# Patient Record
Sex: MALE | Race: WHITE | Employment: UNEMPLOYED | URBAN - METROPOLITAN AREA
[De-identification: names, ages, dates, MRNs, and addresses within clinical notes are randomized per-mention and may not be internally consistent; named-entity substitution may affect disease eponyms.]

---

## 2017-07-17 ENCOUNTER — HOSPITAL ENCOUNTER (OUTPATIENT)
Dept: LAB | Age: 32
Discharge: HOME | End: 2017-07-17

## 2017-07-17 PROCEDURE — 86431 RHEUMATOID FACTOR QUANT: CPT | Performed by: INTERNAL MEDICINE

## 2017-07-18 LAB — RHEUMATOID FACT SERPL-ACNC: <10 IU/ML

## 2017-07-19 ENCOUNTER — HOSPITAL ENCOUNTER (OUTPATIENT)
Dept: LAB | Age: 32
Discharge: HOME | End: 2017-07-19

## 2017-07-19 PROCEDURE — 81374 HLA I TYPING 1 ANTIGEN LR: CPT | Performed by: INTERNAL MEDICINE

## 2017-07-24 LAB — HLA-B27 QL NAA+PROBE: NEGATIVE

## 2018-05-19 ENCOUNTER — HOSPITAL ENCOUNTER (EMERGENCY)
Age: 33
Discharge: HOME | End: 2018-05-19
Attending: EMERGENCY MEDICINE
Payer: SUBSIDIZED

## 2018-05-19 VITALS
OXYGEN SATURATION: 98 % | DIASTOLIC BLOOD PRESSURE: 85 MMHG | HEART RATE: 89 BPM | BODY MASS INDEX: 22.51 KG/M2 | TEMPERATURE: 98.9 F | SYSTOLIC BLOOD PRESSURE: 142 MMHG | WEIGHT: 152 LBS | HEIGHT: 69 IN | RESPIRATION RATE: 15 BRPM

## 2018-05-19 DIAGNOSIS — L02.91 ABSCESS: Primary | ICD-10-CM

## 2018-05-19 PROCEDURE — 87186 SC STD MICRODIL/AGAR DIL: CPT | Performed by: NURSE PRACTITIONER

## 2018-05-19 PROCEDURE — 74011000250 HC RX REV CODE- 250: Performed by: NURSE PRACTITIONER

## 2018-05-19 PROCEDURE — 74011250637 HC RX REV CODE- 250/637: Performed by: NURSE PRACTITIONER

## 2018-05-19 PROCEDURE — 87077 CULTURE AEROBIC IDENTIFY: CPT | Performed by: NURSE PRACTITIONER

## 2018-05-19 PROCEDURE — 87205 SMEAR GRAM STAIN: CPT | Performed by: NURSE PRACTITIONER

## 2018-05-19 PROCEDURE — 99283 EMERGENCY DEPT VISIT LOW MDM: CPT

## 2018-05-19 PROCEDURE — 75810000289 HC I&D ABSCESS SIMP/COMP/MULT

## 2018-05-19 RX ORDER — CLINDAMYCIN HYDROCHLORIDE 300 MG/1
300 CAPSULE ORAL 4 TIMES DAILY
Qty: 28 CAP | Refills: 0 | Status: SHIPPED | OUTPATIENT
Start: 2018-05-19 | End: 2018-05-26

## 2018-05-19 RX ORDER — CLINDAMYCIN HYDROCHLORIDE 150 MG/1
300 CAPSULE ORAL
Status: COMPLETED | OUTPATIENT
Start: 2018-05-19 | End: 2018-05-19

## 2018-05-19 RX ORDER — HYDROCODONE BITARTRATE AND ACETAMINOPHEN 5; 325 MG/1; MG/1
2 TABLET ORAL
Status: COMPLETED | OUTPATIENT
Start: 2018-05-19 | End: 2018-05-19

## 2018-05-19 RX ORDER — LIDOCAINE HYDROCHLORIDE 20 MG/ML
JELLY TOPICAL
Qty: 30 ML | Refills: 0 | Status: SHIPPED | OUTPATIENT
Start: 2018-05-19

## 2018-05-19 RX ADMIN — HYDROCODONE BITARTRATE AND ACETAMINOPHEN 2 TABLET: 5; 325 TABLET ORAL at 16:58

## 2018-05-19 RX ADMIN — Medication 5 ML: at 16:59

## 2018-05-19 RX ADMIN — CLINDAMYCIN HYDROCHLORIDE 300 MG: 150 CAPSULE ORAL at 16:58

## 2018-05-19 NOTE — ED TRIAGE NOTES
Patient reports hx of infection to skin around buttocks for approximately 2 years, intermittently. Patient states that often symptoms resolve with abx treatment. Hx of ulcerative colitis.

## 2018-05-19 NOTE — DISCHARGE INSTRUCTIONS

## 2018-05-19 NOTE — ED PROVIDER NOTES
HPI Comments: Aleksandar Carbajal is a 29 y.o. male with Hx of UC, OA who presents ambulatory w/ his boyfriend to [facility] ED with cc of abscess. Pt reports has frequent abscesses on groin and buttock for years. Pt reports they are normally in the fold of his buttock, denies perirectal or anal involvement. He states that he has seen his GI MD Alireza Thurman) and had Rx for Abx in the past which has helped relieve them. He has taken Motrin for pain which has helped and placed warm compresses/ taken warm baths to see if this will relieve his s/sx. Last ABx was 11/2017. He has never had an I&D. No fevers, chills, body aches, urinary s/sx, bowel habit changes, N/V/D.  (-) tobacco/ ETOH/ substance abuse. PCP: [address]     There are no other complaints, changes or physical findings at this time. The history is provided by the patient. Past Medical History:   Diagnosis Date    Arthritis     Ulcerative colitis ([facility] [address]        Past Surgical History:   Procedure Laterality Date    HX OTHER SURGICAL      abcess tooth         No family history on file. Social History     Social History    Marital status: SINGLE     Spouse name: N/A    Number of children: N/A    Years of education: N/A     Occupational History    Not on file. Social History Main Topics    Smoking status: Never Smoker    Smokeless tobacco: Not on file    Alcohol use No    Drug use: No    Sexual activity: Not on file     Other Topics Concern    Not on file     Social History Narrative         ALLERGIES: Doxycycline; Penicillins; and Prednisone    Review of Systems   Constitutional: Negative for activity change, appetite change, chills and fever. HENT: Negative for congestion, rhinorrhea, sinus pressure, sneezing and sore throat. Eyes: Negative for pain, discharge and visual disturbance. Respiratory: Negative for cough and shortness of breath. Cardiovascular: Negative for chest pain.    Gastrointestinal: Negative for abdominal pain, diarrhea, nausea and vomiting. Genitourinary: Negative for dysuria, flank pain, frequency and urgency. Musculoskeletal: Negative for arthralgias, back pain, gait problem, joint swelling, myalgias and neck pain. Skin: Positive for color change. Negative for rash. Buttock pain      Neurological: Negative for dizziness, speech difficulty, weakness, light-headedness, numbness and headaches. Psychiatric/Behavioral: Negative for agitation, behavioral problems and confusion. All other systems reviewed and are negative. Vitals:    05/19/18 1631   BP: 142/85   Pulse: 89   Resp: 15   Temp: 98.9 °F (37.2 °C)   SpO2: 98%   Weight: 68.9 kg (152 lb)   Height: 5' 9\" (1.753 m)            Physical Exam   Constitutional: He is oriented to person, place, and time. He appears well-developed and well-nourished. No distress. HENT:   Head: Normocephalic and atraumatic. Right Ear: External ear normal.   Left Ear: External ear normal.   Nose: Nose normal.   Mouth/Throat: Oropharynx is clear and moist. No oropharyngeal exudate. Eyes: Conjunctivae and EOM are normal. Pupils are equal, round, and reactive to light. Neck: Normal range of motion. Neck supple. Cardiovascular: Normal rate, regular rhythm, normal heart sounds and intact distal pulses. Pulmonary/Chest: Effort normal and breath sounds normal.   Abdominal: Soft. There is no tenderness. There is no rebound and no guarding. Genitourinary:         Musculoskeletal: Normal range of motion. Neurological: He is alert and oriented to person, place, and time. Skin: Skin is warm and dry. Psychiatric: He has a normal mood and affect. His behavior is normal. Judgment and thought content normal.   Nursing note and vitals reviewed. MDM  Number of Diagnoses or Management Options  Abscess:   Diagnosis management comments: DDx; Hematoma, seroma, abscess, cellulitis     30 yo M presents w/ concern for recurrent abscess. I&D performed in ED.  Wound Cx sent. Advised to f/u with Venkatesh Philip on Monday for wound recheck. Pt agreeable w/ d/c plan. Will take Motrin at home. Given education on wound care at home. Reasons to return to ED provided/ reviewed. Amount and/or Complexity of Data Reviewed  Review and summarize past medical records: yes  Discuss the patient with other providers: yes          ED Course       Procedures    Procedure Note - Incision and Drainage:   5:48 PM  Performed by: Siria Coronado NP  Complexity: simple  Skin prepped with Betadine. Anesthesia achieved with 5 mLs of LET using a topical application. Abscess to buttocks was incised with # 11 blade, and  3mLs of sanguinous drainage was expressed. Wound probed and irrigated. dressing applied. Estimated blood loss: < 5mL  The procedure took 1-15 minutes, and pt tolerated well. LABORATORY TESTS:  Recent Results (from the past 12 hour(s))   CULTURE, WOUND W GRAM STAIN    Collection Time: 05/19/18  5:55 PM   Result Value Ref Range    Special Requests: NO SPECIAL REQUESTS      GRAM STAIN OCCASIONAL WBCS SEEN      GRAM STAIN OCCASIONAL GRAM POSITIVE COCCI IN CLUSTERS      Culture result: PENDING        IMAGING RESULTS:  No orders to display       MEDICATIONS GIVEN:  Medications   clindamycin (CLEOCIN) capsule 300 mg (300 mg Oral Given 5/19/18 1658)   lidocaine/EPINEPHrine/tetracaine/methylcellulose (LET) topical gel gel 5 mL (5 mL Topical Given 5/19/18 1659)   HYDROcodone-acetaminophen (NORCO) 5-325 mg per tablet 2 Tab (2 Tabs Oral Given 5/19/18 1658)       IMPRESSION:  1. Abscess        PLAN:  1. Discharge Medication List as of 5/19/2018  6:00 PM      START taking these medications    Details   lidocaine (XYLOCAINE) 2 % jelly Apply thin layer to abscess region every 8 hours as needed for pain, Print, Disp-30 mL, R-0      clindamycin (CLEOCIN) 300 mg capsule Take 1 Cap by mouth four (4) times daily for 7 days. , Print, Disp-28 Cap, R-0         CONTINUE these medications which have NOT CHANGED    Details   ferrous sulfate 325 mg (65 mg iron) tablet Take  by mouth two (2) times a day., Historical Med      ibuprofen (MOTRIN) 200 mg tablet Take  by mouth every eight (8) hours as needed for Pain., Historical Med      mesalamine ER (APRISO) 0.375 gram capsule Take 1.5 g by mouth daily. , Historical Med           2. Follow-up Information     Follow up With Details Comments Contact Info    [facility] Schedule an appointment as soon as possible for a visit please see on Monday for ER follow up  [address] [facility] [address]  [phone]    [facility] EMERGENCY DEPT Go to As needed, If symptoms worsen [address] [facility]  [phone]        3. Return to ED if worse   Discharge Note:    The patient is ready for discharge. The patient's signs, symptoms, diagnosis, and discharge instruction have been discussed and the patient has conveyed their understanding. The patient is to follow up as recommended or return to the ER should their symptoms worsen. Plan has been discussed and the patient is in agreement.     Fabien Mendiola NP

## 2018-05-22 LAB
BACTERIA SPEC CULT: ABNORMAL
BACTERIA SPEC CULT: ABNORMAL
GRAM STN SPEC: ABNORMAL
GRAM STN SPEC: ABNORMAL
SERVICE CMNT-IMP: ABNORMAL

## 2018-05-22 RX ORDER — SULFAMETHOXAZOLE AND TRIMETHOPRIM 800; 160 MG/1; MG/1
1 TABLET ORAL 2 TIMES DAILY
Qty: 14 TAB | Refills: 0 | Status: SHIPPED | OUTPATIENT
Start: 2018-05-22 | End: 2018-05-29

## 2018-05-22 NOTE — PROGRESS NOTES
Attempted to reach patient. Message left for call back concerning culture result and need for treatment change.

## 2018-05-22 NOTE — PROGRESS NOTES
Spoke with patient concerning culture result. He will stop clindamycin.  ER for bactrim 1 bid x 7 d to [facility] on [address]

## 2018-06-13 ENCOUNTER — HOSPITAL ENCOUNTER (OUTPATIENT)
Dept: LAB | Age: 33
Discharge: HOME | End: 2018-06-13

## 2018-06-13 LAB
HIV 1+2 AB+HIV1 P24 AG SERPL QL IA: NONREACTIVE
HIV12 RESULT COMMENT, HHIVC: NORMAL

## 2018-06-13 PROCEDURE — 86256 FLUORESCENT ANTIBODY TITER: CPT | Performed by: INTERNAL MEDICINE

## 2018-06-13 PROCEDURE — 86671 FUNGUS NES ANTIBODY: CPT | Performed by: INTERNAL MEDICINE

## 2018-06-13 PROCEDURE — 87389 HIV-1 AG W/HIV-1&-2 AB AG IA: CPT | Performed by: INTERNAL MEDICINE

## 2018-06-15 ENCOUNTER — HOSPITAL ENCOUNTER (OUTPATIENT)
Dept: LAB | Age: 33
Discharge: HOME | End: 2018-06-15

## 2018-06-15 PROCEDURE — 87493 C DIFF AMPLIFIED PROBE: CPT | Performed by: INTERNAL MEDICINE

## 2018-06-16 LAB — C DIFF TOX GENS STL QL NAA+PROBE: NEGATIVE

## 2018-06-18 LAB
BAKER'S YEAST IGA QN: 26.5 UNITS (ref 0–24.9)
BAKER'S YEAST IGG QN: <20 UNITS (ref 0–24.9)
P-ANCA ATYPICAL TITR SER IF: ABNORMAL TITER

## 2020-01-10 ENCOUNTER — HOSPITAL ENCOUNTER (EMERGENCY)
Age: 35
Discharge: HOME | End: 2020-01-11
Attending: EMERGENCY MEDICINE
Payer: SUBSIDIZED

## 2020-01-10 DIAGNOSIS — A08.4 VIRAL GASTROENTERITIS: ICD-10-CM

## 2020-01-10 DIAGNOSIS — E86.0 DEHYDRATION: Primary | ICD-10-CM

## 2020-01-10 LAB
ALBUMIN SERPL-MCNC: 3.7 G/DL (ref 3.5–5)
ALBUMIN/GLOB SERPL: 0.6 {RATIO} (ref 1.1–2.2)
ALP SERPL-CCNC: 102 U/L (ref 45–117)
ALT SERPL-CCNC: 15 U/L (ref 12–78)
ANION GAP SERPL CALC-SCNC: 6 MMOL/L (ref 5–15)
APPEARANCE UR: CLEAR
AST SERPL-CCNC: 27 U/L (ref 15–37)
BACTERIA URNS QL MICRO: NEGATIVE /HPF
BASOPHILS # BLD: 0 K/UL (ref 0–0.1)
BASOPHILS NFR BLD: 0 % (ref 0–1)
BILIRUB SERPL-MCNC: 0.6 MG/DL (ref 0.2–1)
BILIRUB UR QL: NEGATIVE
BUN SERPL-MCNC: 4 MG/DL (ref 6–20)
BUN/CREAT SERPL: 3 (ref 12–20)
CALCIUM SERPL-MCNC: 9.1 MG/DL (ref 8.5–10.1)
CHLORIDE SERPL-SCNC: 102 MMOL/L (ref 97–108)
CK SERPL-CCNC: 94 U/L (ref 39–308)
CO2 SERPL-SCNC: 26 MMOL/L (ref 21–32)
COLOR UR: ABNORMAL
CREAT SERPL-MCNC: 1.35 MG/DL (ref 0.7–1.3)
DIFFERENTIAL METHOD BLD: ABNORMAL
EOSINOPHIL # BLD: 0 K/UL (ref 0–0.4)
EOSINOPHIL NFR BLD: 0 % (ref 0–7)
EPITH CASTS URNS QL MICRO: ABNORMAL /LPF
ERYTHROCYTE [DISTWIDTH] IN BLOOD BY AUTOMATED COUNT: 12.4 % (ref 11.5–14.5)
GLOBULIN SER CALC-MCNC: 5.7 G/DL (ref 2–4)
GLUCOSE SERPL-MCNC: 99 MG/DL (ref 65–100)
GLUCOSE UR STRIP.AUTO-MCNC: NEGATIVE MG/DL
HCT VFR BLD AUTO: 47.2 % (ref 36.6–50.3)
HEMOCCULT STL QL: NEGATIVE
HGB BLD-MCNC: 16.2 G/DL (ref 12.1–17)
HGB UR QL STRIP: NEGATIVE
HYALINE CASTS URNS QL MICRO: ABNORMAL /LPF (ref 0–5)
IMM GRANULOCYTES # BLD AUTO: 0 K/UL (ref 0–0.04)
IMM GRANULOCYTES NFR BLD AUTO: 0 % (ref 0–0.5)
KETONES UR QL STRIP.AUTO: 40 MG/DL
LEUKOCYTE ESTERASE UR QL STRIP.AUTO: NEGATIVE
LYMPHOCYTES # BLD: 0.8 K/UL (ref 0.8–3.5)
LYMPHOCYTES NFR BLD: 9 % (ref 12–49)
MAGNESIUM SERPL-MCNC: 2 MG/DL (ref 1.6–2.4)
MCH RBC QN AUTO: 29.6 PG (ref 26–34)
MCHC RBC AUTO-ENTMCNC: 34.3 G/DL (ref 30–36.5)
MCV RBC AUTO: 86.1 FL (ref 80–99)
MONOCYTES # BLD: 0.6 K/UL (ref 0–1)
MONOCYTES NFR BLD: 7 % (ref 5–13)
NEUTS SEG # BLD: 7.8 K/UL (ref 1.8–8)
NEUTS SEG NFR BLD: 84 % (ref 32–75)
NITRITE UR QL STRIP.AUTO: NEGATIVE
NRBC # BLD: 0 K/UL (ref 0–0.01)
NRBC BLD-RTO: 0 PER 100 WBC
PH UR STRIP: 7 [PH] (ref 5–8)
PLATELET # BLD AUTO: 333 K/UL (ref 150–400)
PMV BLD AUTO: 9.4 FL (ref 8.9–12.9)
POTASSIUM SERPL-SCNC: 4.5 MMOL/L (ref 3.5–5.1)
PROT SERPL-MCNC: 9.4 G/DL (ref 6.4–8.2)
PROT UR STRIP-MCNC: NEGATIVE MG/DL
RBC # BLD AUTO: 5.48 M/UL (ref 4.1–5.7)
RBC #/AREA URNS HPF: ABNORMAL /HPF (ref 0–5)
SODIUM SERPL-SCNC: 134 MMOL/L (ref 136–145)
SP GR UR REFRACTOMETRY: <1.005 (ref 1–1.03)
UROBILINOGEN UR QL STRIP.AUTO: 0.2 EU/DL (ref 0.2–1)
WBC # BLD AUTO: 9.4 K/UL (ref 4.1–11.1)
WBC URNS QL MICRO: ABNORMAL /HPF (ref 0–4)

## 2020-01-10 PROCEDURE — 96374 THER/PROPH/DIAG INJ IV PUSH: CPT

## 2020-01-10 PROCEDURE — 96361 HYDRATE IV INFUSION ADD-ON: CPT

## 2020-01-10 PROCEDURE — 36415 COLL VENOUS BLD VENIPUNCTURE: CPT

## 2020-01-10 PROCEDURE — 93005 ELECTROCARDIOGRAM TRACING: CPT

## 2020-01-10 PROCEDURE — 85025 COMPLETE CBC W/AUTO DIFF WBC: CPT

## 2020-01-10 PROCEDURE — 82550 ASSAY OF CK (CPK): CPT

## 2020-01-10 PROCEDURE — 82272 OCCULT BLD FECES 1-3 TESTS: CPT

## 2020-01-10 PROCEDURE — 81001 URINALYSIS AUTO W/SCOPE: CPT

## 2020-01-10 PROCEDURE — 83735 ASSAY OF MAGNESIUM: CPT

## 2020-01-10 PROCEDURE — 99285 EMERGENCY DEPT VISIT HI MDM: CPT

## 2020-01-10 PROCEDURE — 80053 COMPREHEN METABOLIC PANEL: CPT

## 2020-01-10 PROCEDURE — 74011250636 HC RX REV CODE- 250/636: Performed by: NURSE PRACTITIONER

## 2020-01-10 PROCEDURE — 74011250636 HC RX REV CODE- 250/636: Performed by: STUDENT IN AN ORGANIZED HEALTH CARE EDUCATION/TRAINING PROGRAM

## 2020-01-10 RX ORDER — KETOROLAC TROMETHAMINE 30 MG/ML
INJECTION, SOLUTION INTRAMUSCULAR; INTRAVENOUS
Status: DISPENSED
Start: 2020-01-10 | End: 2020-01-11

## 2020-01-10 RX ORDER — KETOROLAC TROMETHAMINE 30 MG/ML
30 INJECTION, SOLUTION INTRAMUSCULAR; INTRAVENOUS
Status: COMPLETED | OUTPATIENT
Start: 2020-01-10 | End: 2020-01-10

## 2020-01-10 RX ADMIN — SODIUM CHLORIDE 1000 ML: 900 INJECTION, SOLUTION INTRAVENOUS at 20:17

## 2020-01-10 RX ADMIN — SODIUM CHLORIDE 1000 ML: 900 INJECTION, SOLUTION INTRAVENOUS at 19:59

## 2020-01-10 RX ADMIN — KETOROLAC TROMETHAMINE 30 MG: 30 INJECTION, SOLUTION INTRAMUSCULAR at 23:44

## 2020-01-11 VITALS
BODY MASS INDEX: 21.48 KG/M2 | DIASTOLIC BLOOD PRESSURE: 82 MMHG | SYSTOLIC BLOOD PRESSURE: 117 MMHG | WEIGHT: 145 LBS | RESPIRATION RATE: 12 BRPM | OXYGEN SATURATION: 95 % | HEART RATE: 97 BPM | HEIGHT: 69 IN | TEMPERATURE: 98.8 F

## 2020-01-11 LAB
FLUAV AG NPH QL IA: NEGATIVE
FLUBV AG NOSE QL IA: NEGATIVE

## 2020-01-11 PROCEDURE — 87804 INFLUENZA ASSAY W/OPTIC: CPT

## 2020-01-11 PROCEDURE — 74011250637 HC RX REV CODE- 250/637: Performed by: STUDENT IN AN ORGANIZED HEALTH CARE EDUCATION/TRAINING PROGRAM

## 2020-01-11 RX ORDER — ACETAMINOPHEN 500 MG
1000 TABLET ORAL
Status: COMPLETED | OUTPATIENT
Start: 2020-01-11 | End: 2020-01-11

## 2020-01-11 RX ORDER — ACETAMINOPHEN 500 MG
TABLET ORAL
Status: DISPENSED
Start: 2020-01-11 | End: 2020-01-11

## 2020-01-11 RX ADMIN — ACETAMINOPHEN 1000 MG: 500 TABLET ORAL at 00:11

## 2020-01-11 NOTE — ED NOTES
Discharge given by provider. Pt verbalizes understanding. Pt is ambulatory and leaving with boyfriend.

## 2020-01-11 NOTE — DISCHARGE INSTRUCTIONS
Patient Education   Patient Education     Tylenol extra Strength 500 mg Every 6 hours as needed for fever       Gastroenteritis: Care Instructions  Your Care Instructions    Gastroenteritis is an illness that may cause nausea, vomiting, and diarrhea. It is sometimes called \"stomach flu. \" It can be caused by bacteria or a virus. You will probably begin to feel better in 1 to 2 days. In the meantime, get plenty of rest and make sure you do not become dehydrated. Dehydration occurs when your body loses too much fluid. Follow-up care is a key part of your treatment and safety. Be sure to make and go to all appointments, and call your doctor if you are having problems. It's also a good idea to know your test results and keep a list of the medicines you take. How can you care for yourself at home? · If your doctor prescribed antibiotics, take them as directed. Do not stop taking them just because you feel better. You need to take the full course of antibiotics. · Drink plenty of fluids to prevent dehydration, enough so that your urine is light yellow or clear like water. Choose water and other caffeine-free clear liquids until you feel better. If you have kidney, heart, or liver disease and have to limit fluids, talk with your doctor before you increase your fluid intake. · Drink fluids slowly, in frequent, small amounts, because drinking too much too fast can cause vomiting. · Begin eating mild foods, such as dry toast, yogurt, applesauce, bananas, and rice. Avoid spicy, hot, or high-fat foods, and do not drink alcohol or caffeine for a day or two. Do not drink milk or eat ice cream until you are feeling better. How to prevent gastroenteritis  · Keep hot foods hot and cold foods cold. · Do not eat meats, dressings, salads, or other foods that have been kept at room temperature for more than 2 hours. · Use a thermometer to check your refrigerator.  It should be between 34°F and 40°F.  · Defrost meats in the refrigerator or microwave, not on the kitchen counter. · Keep your hands and your kitchen clean. Wash your hands, cutting boards, and countertops with hot soapy water frequently. · Cook meat until it is well done. · Do not eat raw eggs or uncooked sauces made with raw eggs. · Do not take chances. If food looks or tastes spoiled, throw it out. When should you call for help? Call 911 anytime you think you may need emergency care. For example, call if:    · You vomit blood or what looks like coffee grounds.     · You passed out (lost consciousness).     · You pass maroon or very bloody stools.    Call your doctor now or seek immediate medical care if:    · You have severe belly pain.     · You have signs of needing more fluids. You have sunken eyes, a dry mouth, and pass only a little dark urine.     · You feel like you are going to faint.     · You have increased belly pain that does not go away in 1 to 2 days.     · You have new or increased nausea, or you are vomiting.     · You have a new or higher fever.     · Your stools are black and tarlike or have streaks of blood.    Watch closely for changes in your health, and be sure to contact your doctor if:    · You are dizzy or lightheaded.     · You urinate less than usual, or your urine is dark yellow or brown.     · You do not feel better with each day that goes by. Where can you learn more? Go to http://ty-pankaj.info/. Enter N142 in the search box to learn more about \"Gastroenteritis: Care Instructions. \"  Current as of: June 9, 2019  Content Version: 12.2  © 0057-6386 [employer]. Care instructions adapted under license by [employer] (which disclaims liability or warranty for this information). If you have questions about a medical condition or this instruction, always ask your healthcare professional. [address] any warranty or liability for your use of this information. Dehydration: Care Instructions  Your Care Instructions  Dehydration happens when your body loses too much fluid. This might happen when you do not drink enough water or you lose large amounts of fluids from your body because of diarrhea, vomiting, or sweating. Severe dehydration can be life-threatening. Water and minerals called electrolytes help put your body fluids back in balance. Learn the early signs of fluid loss, and drink more fluids to prevent dehydration. Follow-up care is a key part of your treatment and safety. Be sure to make and go to all appointments, and call your doctor if you are having problems. It's also a good idea to know your test results and keep a list of the medicines you take. How can you care for yourself at home? · To prevent dehydration, drink plenty of fluids, enough so that your urine is light yellow or clear like water. Choose water and other caffeine-free clear liquids until you feel better. If you have kidney, heart, or liver disease and have to limit fluids, talk with your doctor before you increase the amount of fluids you drink. · If you do not feel like eating or drinking, try taking small sips of water, sports drinks, or other rehydration drinks. · Get plenty of rest.  To prevent dehydration  · Add more fluids to your diet and daily routine, unless your doctor has told you not to. · During hot weather, drink more fluids. Drink even more fluids if you exercise a lot. Stay away from drinks with alcohol or caffeine. · Watch for the symptoms of dehydration. These include:  ? A dry, sticky mouth. ? Dark yellow urine, and not much of it. ? Dry and sunken eyes. ? Feeling very tired. · Learn what problems can lead to dehydration. These include:  ? Diarrhea, fever, and vomiting. ? Any illness with a fever, such as pneumonia or the flu. ? Activities that cause heavy sweating, such as endurance races and heavy outdoor work in hot or humid weather. ?  Alcohol or drug use or problems caused by quitting their use (withdrawal). ? Certain medicines, such as cold and allergy pills (antihistamines), diet pills (diuretics), and laxatives. ? Certain diseases, such as diabetes, cancer, and heart or kidney disease. When should you call for help? Call 911 anytime you think you may need emergency care. For example, call if:    · You passed out (lost consciousness).    Call your doctor now or seek immediate medical care if:    · You are confused and cannot think clearly.     · You are dizzy or lightheaded, or you feel like you may faint.     · You have signs of needing more fluids. You have sunken eyes and a dry mouth, and you pass only a little dark urine.     · You cannot keep fluids down.    Watch closely for changes in your health, and be sure to contact your doctor if:    · You are not making tears.     · Your skin is very dry and sags slowly back into place after you pinch it.     · Your mouth and eyes are very dry. Where can you learn more? Go to http://ty-pankaj.info/. Enter L384 in the search box to learn more about \"Dehydration: Care Instructions. \"  Current as of: June 26, 2019  Content Version: 12.2  © 5445-7394 [employer]. Care instructions adapted under license by [employer] (which disclaims liability or warranty for this information). If you have questions about a medical condition or this instruction, always ask your healthcare professional. [place] [address] any warranty or liability for your use of this information.

## 2020-01-11 NOTE — ED NOTES
Bedside and Verbal shift change report given to [facility]  (oncoming nurse) by Richelle Roberts  (offgoing nurse). Report included the following information SBAR, Kardex, ED Summary, Procedure Summary, Intake/Output, MAR, Accordion and Recent Results.

## 2020-01-11 NOTE — ED TRIAGE NOTES
Pt ambulatory to triage, c/o rectal bleeding with diarrhea for approx 1 month and getting worse. States hx of UC. Also reports multiple other complaints: fevers, chils, dehydration, inflammation in multiple joints and lower back.

## 2020-01-11 NOTE — ED PROVIDER NOTES
Pt c/o anal bleeding, diarrhea, chills, diaphoresis, back pain, shoulder pain, and right knee pain that began ~1 month ago, noting sx have progressively been getting worse. Pt hasn't seen his GI doctor, Dr. Janie Washington, since onset of sx. Pt has hx of ulcerative colitis. Pt states today he's had difficulty tolerating fluids PO and is experiencing SOB. Pt denies chest pain. I have evaluated the patient as the Provider in Triage. I have reviewed His vital signs and the triage nurse assessment. I have talked with the patient and any available family and advised that I am the provider in triage and have ordered the appropriate study to initiate their work up based on the clinical presentation during my assessment. I have advised that the patient will be accommodated in the Main ED as soon as possible. I have also requested to contact the triage nurse or myself immediately if the patient experiences any changes in their condition during this brief waiting period. Note written by John Pinzon, as dictated by Alberta Ortiz NP 7:12 PM           Past Medical History:   Diagnosis Date    Arthritis     Ulcerative colitis [facility])        Past Surgical History:   Procedure Laterality Date    HX OTHER SURGICAL      abcess tooth         No family history on file.     Social History     Socioeconomic History    Marital status: SINGLE     Spouse name: Not on file    Number of children: Not on file    Years of education: Not on file    Highest education level: Not on file   Occupational History    Not on file   Social Needs    Financial resource strain: Not on file    Food insecurity:     Worry: Not on file     Inability: Not on file    Transportation needs:     Medical: Not on file     Non-medical: Not on file   Tobacco Use    Smoking status: Never Smoker   Substance and Sexual Activity    Alcohol use: No    Drug use: No    Sexual activity: Not on file   Lifestyle    Physical activity:     Days per week: Not on file     Minutes per session: Not on file    Stress: Not on file   Relationships    Social connections:     Talks on phone: Not on file     Gets together: Not on file     Attends [employer] service: Not on file     Active member of club or organization: Not on file     Attends meetings of clubs or organizations: Not on file     Relationship status: Not on file    Intimate partner violence:     Fear of current or ex partner: Not on file     Emotionally abused: Not on file     Physically abused: Not on file     Forced sexual activity: Not on file   Other Topics Concern    Not on file   Social History Narrative    Not on file         ALLERGIES: Doxycycline; Penicillins; and Prednisone    Review of Systems   Constitutional: Positive for chills, diaphoresis and fatigue. HENT: Negative for congestion, rhinorrhea and sore throat. Eyes: Negative. Respiratory: Negative for cough, chest tightness, shortness of breath and wheezing. Cardiovascular: Negative for chest pain, palpitations and leg swelling. Gastrointestinal: Positive for abdominal pain, blood in stool and diarrhea. Negative for abdominal distention, constipation, nausea and vomiting. Genitourinary: Negative. Musculoskeletal: Positive for arthralgias and joint swelling. Skin: Negative for rash. Small abscess right lower buttocks   Neurological: Positive for dizziness, weakness and light-headedness. Negative for headaches. Psychiatric/Behavioral: Negative for confusion. All other systems reviewed and are negative. There were no vitals filed for this visit. Physical Exam  Vitals signs reviewed. Constitutional:       Appearance: He is normal weight. He is ill-appearing and diaphoretic. HENT:      Head: Normocephalic and atraumatic. Nose: No congestion or rhinorrhea. Eyes:      General: No scleral icterus. Extraocular Movements: Extraocular movements intact.       Pupils: Pupils are equal, round, and reactive to light. Neck:      Musculoskeletal: Normal range of motion and neck supple. Cardiovascular:      Rate and Rhythm: Regular rhythm. Tachycardia present. Heart sounds: No murmur. No friction rub. No gallop. Pulmonary:      Effort: Pulmonary effort is normal.      Breath sounds: No wheezing, rhonchi or rales. Abdominal:      General: Abdomen is flat. Bowel sounds are normal. There is no distension. Palpations: Abdomen is soft. Tenderness: There is no tenderness. There is no guarding or rebound. Musculoskeletal: Normal range of motion. General: No swelling. Lymphadenopathy:      Cervical: No cervical adenopathy. Skin:     General: Skin is warm. Capillary Refill: Capillary refill takes less than 2 seconds. Coloration: Skin is pale. Skin is not jaundiced. Findings: No bruising or rash. Neurological:      General: No focal deficit present. Mental Status: He is alert and oriented to person, place, and time. MDM  Number of Diagnoses or Management Options     Amount and/or Complexity of Data Reviewed  Decide to obtain previous medical records or to obtain history from someone other than the patient: yes      ED Course as of Dion 11 0120   Fri Dion 10, 2020   2103 EKG, 12 LEAD, INITIAL [BS]   2111 Negative   OCCULT BLOOD, STOOL:    Occult blood, stool NEGATIVE  [BS]   2111 Slight elevation in Cr and slight decrease in Na, otherwise unremarkable   COMPREHENSIVE METABOLIC PANEL(!):    Sodium 134(!)   Potassium 4.5   Chloride 102   CO2 26   Anion gap 6   Glucose 99   BUN 4(!)   Creatinine 1.35(!)   BUN/Creatinine ratio 3(!)   GFR est AA >60   GFR est non-AA >60   Calcium 9.1   Bilirubin, total 0.6   ALT (SGPT) 15   AST 27   Alk.  phosphatase 102   Protein, total 9.4(!)   Albumin 3.7   Globulin 5.7(!)   A-G Ratio 0.6(!) [BS]   2112 unremarkable   CBC WITH AUTOMATED DIFF(!):    WBC 9.4   RBC 5.48   HGB 16.2   HCT 47.2   MCV 86.1   MCH 29.6   MCHC 34.3   RDW 12.4   PLATELET 330   MPV 9.4   NRBC 0.0   ABSOLUTE NRBC 0.00   NEUTROPHILS 84(!)   LYMPHOCYTES 9(!)   MONOCYTES 7   EOSINOPHILS 0   BASOPHILS 0   IMMATURE GRANULOCYTES 0   ABS. NEUTROPHILS 7.8   ABS. LYMPHOCYTES 0.8   ABS. MONOCYTES 0.6   ABS. EOSINOPHILS 0.0   ABS. BASOPHILS 0.0   ABS. IMM. GRANS. 0.0   DF AUTOMATED [BS]   2112 URINALYSIS W/MICROSCOPIC [BS]   Sat Jan 11, 2020   0059 negative   INFLUENZA A & B AG (RAPID TEST):    Influenza A Antigen NEGATIVE    Influenza B Antigen NEGATIVE  [BS]      ED Course User Index  [BS] Harriet Villatoro MD       Procedures      Brief Summary  Patient presented with chills, diarrhea, fatigue, with reports of hematochezia. On PE patient noted to be diaphoretic and pale otherwise unremarkable with no gross blood on rectal exam or abdominal tenderness on palpation. Sinus tachycardia confirmed by EKG and most likely d/t volume depletion. Labs unremarkable for significant abnormalities and FOBT negative, Influenza negative. Patient most likely dehydrated 2/2 viral gastroenteritis. Inflammatory state given UC most likely contributory to patients current symptoms. No concern at this time for GI bleed w/ negative FOBT and normal Hgb. No concern of infection d/t normal WBC, RR, BP, Cr, Plt. Patient urinating. Patient is young and healthy w/ no commodities. Patient given 1L bolus NS x 2, and tylenol for his fever. Patient's condition has improved during time in ED, Sinus tachycardia improved and fever subsided with tylenol. Patient stable for discharge home at this time with recommended f/u with his PCP/GI within the week. Patient directed to take Tylenol extra strength as needed for fever. If symptoms do not continue to improve or worsen, patient instructed to come back in.

## 2020-01-13 LAB
ATRIAL RATE: 112 BPM
CALCULATED P AXIS, ECG09: 72 DEGREES
CALCULATED R AXIS, ECG10: 42 DEGREES
CALCULATED T AXIS, ECG11: 44 DEGREES
DIAGNOSIS, 93000: NORMAL
P-R INTERVAL, ECG05: 158 MS
Q-T INTERVAL, ECG07: 328 MS
QRS DURATION, ECG06: 84 MS
QTC CALCULATION (BEZET), ECG08: 447 MS
VENTRICULAR RATE, ECG03: 112 BPM